# Patient Record
Sex: MALE | Race: BLACK OR AFRICAN AMERICAN | URBAN - METROPOLITAN AREA
[De-identification: names, ages, dates, MRNs, and addresses within clinical notes are randomized per-mention and may not be internally consistent; named-entity substitution may affect disease eponyms.]

---

## 2023-06-01 ENCOUNTER — OFFICE VISIT (OUTPATIENT)
Dept: URBAN - METROPOLITAN AREA CLINIC 105 | Facility: CLINIC | Age: 38
End: 2023-06-01
Payer: COMMERCIAL

## 2023-06-01 VITALS
HEART RATE: 71 BPM | SYSTOLIC BLOOD PRESSURE: 154 MMHG | DIASTOLIC BLOOD PRESSURE: 97 MMHG | WEIGHT: 197 LBS | TEMPERATURE: 97 F | BODY MASS INDEX: 26.68 KG/M2 | HEIGHT: 72 IN

## 2023-06-01 DIAGNOSIS — K92.1 HEMATOCHEZIA: ICD-10-CM

## 2023-06-01 DIAGNOSIS — R19.4 CHANGE IN BOWEL HABITS: ICD-10-CM

## 2023-06-01 DIAGNOSIS — K64.1 GRADE II HEMORRHOIDS: ICD-10-CM

## 2023-06-01 DIAGNOSIS — E55.9 VITAMIN D DEFICIENCY: ICD-10-CM

## 2023-06-01 PROBLEM — 34713006: Status: ACTIVE | Noted: 2023-06-01

## 2023-06-01 PROCEDURE — 99205 OFFICE O/P NEW HI 60 MIN: CPT | Performed by: INTERNAL MEDICINE

## 2023-06-01 PROCEDURE — 99245 OFF/OP CONSLTJ NEW/EST HI 55: CPT | Performed by: INTERNAL MEDICINE

## 2023-06-01 RX ORDER — HYDROCORTISONE 25 MG/G
1 APPLICATION CREAM TOPICAL TWICE A DAY
Qty: 1 | Refills: 3 | OUTPATIENT
Start: 2023-06-01 | End: 2023-09-29

## 2023-06-01 NOTE — HPI-TODAY'S VISIT:
The patient is in good health who presents for evaluation of rectal bleeding over the 3 years. The pt notes that he is  and he eats that he eats 1-2 vegetables daily. The pt notes that he sits in a chair on a regular basis. The  pt denies melena and hematemesis.  The pt notest that he has regjular BM's and he denies constipaiton..

## 2023-06-02 LAB
A/G RATIO: 1.8
ABSOLUTE BASOPHILS: 18
ABSOLUTE EOSINOPHILS: 122
ABSOLUTE LYMPHOCYTES: 2070
ABSOLUTE MONOCYTES: 234
ABSOLUTE NEUTROPHILS: 2057
ALBUMIN: 4.8
ALKALINE PHOSPHATASE: 67
ALT (SGPT): 30
AST (SGOT): 22
BASOPHILS: 0.4
BILIRUBIN, TOTAL: 0.4
BUN/CREATININE RATIO: (no result)
BUN: 16
C-REACTIVE PROTEIN, QUANT: 0.8
CALCIUM: 9.6
CARBON DIOXIDE, TOTAL: 31
CHLORIDE: 102
CREATININE: 1
EGFR: 100
EOSINOPHILS: 2.7
GLOBULIN, TOTAL: 2.7
GLUCOSE: 83
HEMATOCRIT: 42.1
HEMOGLOBIN: 14.2
LYMPHOCYTES: 46
MCH: 29
MCHC: 33.7
MCV: 86.1
MONOCYTES: 5.2
MPV: 9.4
NEUTROPHILS: 45.7
PLATELET COUNT: 212
POTASSIUM: 4.5
PROTEIN, TOTAL: 7.5
RDW: 13.7
RED BLOOD CELL COUNT: 4.89
SODIUM: 140
VITAMIN D,25-OH,TOTAL,IA: 19
WHITE BLOOD CELL COUNT: 4.5

## 2023-10-06 ENCOUNTER — OFFICE VISIT (OUTPATIENT)
Dept: URBAN - METROPOLITAN AREA MEDICAL CENTER 33 | Facility: MEDICAL CENTER | Age: 38
End: 2023-10-06
Payer: COMMERCIAL

## 2023-10-06 DIAGNOSIS — K92.2 GASTROINTESTINAL HEMORRHAGE, UNSPECIFIED: ICD-10-CM

## 2023-10-06 DIAGNOSIS — K63.89 OTHER SPECIFIED DISEASES OF INTESTINE: ICD-10-CM

## 2023-10-06 DIAGNOSIS — K92.1 ACUTE MELENA: ICD-10-CM

## 2023-10-06 DIAGNOSIS — K64.9 UNSPECIFIED HEMORRHOIDS: ICD-10-CM

## 2023-10-06 PROCEDURE — 45380 COLONOSCOPY AND BIOPSY: CPT | Performed by: INTERNAL MEDICINE

## 2023-10-11 ENCOUNTER — TELEPHONE ENCOUNTER (OUTPATIENT)
Dept: URBAN - METROPOLITAN AREA CLINIC 105 | Facility: CLINIC | Age: 38
End: 2023-10-11

## 2023-10-11 RX ORDER — HYDROCORTISONE ACETATE AND PRAMOXINE HYDROCHLORIDE 25; 10 MG/G; MG/G
1 APPLICATION CREAM TOPICAL TWICE A DAY
Qty: 30 GRAM | Refills: 0 | OUTPATIENT
Start: 2023-10-11 | End: 2023-10-25

## 2023-10-11 RX ORDER — OMEPRAZOLE 40 MG/1
1 CAPSULE 30 MINUTES BEFORE MORNING MEAL CAPSULE, DELAYED RELEASE ORAL ONCE A DAY
Qty: 90 | Refills: 1 | OUTPATIENT
Start: 2023-10-17

## 2024-01-10 ENCOUNTER — OFFICE VISIT (OUTPATIENT)
Dept: URBAN - METROPOLITAN AREA CLINIC 17 | Facility: CLINIC | Age: 39
End: 2024-01-10

## 2024-01-10 ENCOUNTER — TELEPHONE ENCOUNTER (OUTPATIENT)
Dept: URBAN - METROPOLITAN AREA CLINIC 105 | Facility: CLINIC | Age: 39
End: 2024-01-10

## 2024-01-18 ENCOUNTER — OFFICE VISIT (OUTPATIENT)
Dept: URBAN - METROPOLITAN AREA CLINIC 105 | Facility: CLINIC | Age: 39
End: 2024-01-18
Payer: COMMERCIAL

## 2024-01-18 ENCOUNTER — TELEPHONE ENCOUNTER (OUTPATIENT)
Dept: URBAN - METROPOLITAN AREA CLINIC 105 | Facility: CLINIC | Age: 39
End: 2024-01-18

## 2024-01-18 VITALS
DIASTOLIC BLOOD PRESSURE: 80 MMHG | HEART RATE: 68 BPM | HEIGHT: 72 IN | BODY MASS INDEX: 26.6 KG/M2 | SYSTOLIC BLOOD PRESSURE: 126 MMHG | WEIGHT: 196.4 LBS | TEMPERATURE: 97.2 F

## 2024-01-18 DIAGNOSIS — K62.89 ANAL PAIN: ICD-10-CM

## 2024-01-18 DIAGNOSIS — K64.8 HEMORRHOIDS, INTERNAL: ICD-10-CM

## 2024-01-18 PROCEDURE — 99214 OFFICE O/P EST MOD 30 MIN: CPT | Performed by: INTERNAL MEDICINE

## 2024-01-18 RX ORDER — OMEPRAZOLE 40 MG/1
1 CAPSULE 30 MINUTES BEFORE MORNING MEAL CAPSULE, DELAYED RELEASE ORAL ONCE A DAY
Qty: 90 | Refills: 1 | Status: ACTIVE | COMMUNITY
Start: 2023-10-17

## 2024-01-18 RX ORDER — HYDROCORTISONE 25 MG/G
1 APPLICATION CREAM TOPICAL TWICE A DAY
Qty: 60 GRAM | Refills: 1 | OUTPATIENT
Start: 2024-01-18 | End: 2024-03-18

## 2024-01-18 NOTE — HPI-TODAY'S VISIT:
The patient has a history of chronic constipation who presents for f/u  for hemorrhoidal banding. The pt is s/p colon 10/2023 normal colon mucosa and swollen hemorrhoids with minimal rectal bleeding. The pt notes that his BM's are normal at the present and he has good soft BM's.  The patitent's time of visit DOS is 30 minutes after review of all old records/

## 2024-02-23 ENCOUNTER — HEM (OUTPATIENT)
Dept: URBAN - METROPOLITAN AREA CLINIC 17 | Facility: CLINIC | Age: 39
End: 2024-02-23

## 2024-02-23 RX ORDER — OMEPRAZOLE 40 MG/1
1 CAPSULE 30 MINUTES BEFORE MORNING MEAL CAPSULE, DELAYED RELEASE ORAL ONCE A DAY
Qty: 90 | Refills: 1 | Status: ACTIVE | COMMUNITY
Start: 2023-10-17

## 2024-02-23 RX ORDER — HYDROCORTISONE 25 MG/G
1 APPLICATION CREAM TOPICAL TWICE A DAY
Qty: 60 GRAM | Refills: 1 | Status: ACTIVE | COMMUNITY
Start: 2024-01-18 | End: 2024-03-18

## 2024-04-15 ENCOUNTER — HEM (OUTPATIENT)
Dept: URBAN - METROPOLITAN AREA CLINIC 17 | Facility: CLINIC | Age: 39
End: 2024-04-15

## 2024-04-15 RX ORDER — OMEPRAZOLE 40 MG/1
1 CAPSULE 30 MINUTES BEFORE MORNING MEAL CAPSULE, DELAYED RELEASE ORAL ONCE A DAY
Qty: 90 | Refills: 1 | Status: ACTIVE | COMMUNITY
Start: 2023-10-17